# Patient Record
Sex: MALE | Race: BLACK OR AFRICAN AMERICAN | NOT HISPANIC OR LATINO | Employment: FULL TIME | URBAN - METROPOLITAN AREA
[De-identification: names, ages, dates, MRNs, and addresses within clinical notes are randomized per-mention and may not be internally consistent; named-entity substitution may affect disease eponyms.]

---

## 2025-01-30 ENCOUNTER — APPOINTMENT (OUTPATIENT)
Dept: PRIMARY CARE | Facility: CLINIC | Age: 30
End: 2025-01-30
Payer: COMMERCIAL

## 2025-01-30 VITALS — WEIGHT: 244 LBS | SYSTOLIC BLOOD PRESSURE: 138 MMHG | DIASTOLIC BLOOD PRESSURE: 86 MMHG

## 2025-01-30 DIAGNOSIS — M54.50 LOW BACK PAIN, UNSPECIFIED BACK PAIN LATERALITY, UNSPECIFIED CHRONICITY, UNSPECIFIED WHETHER SCIATICA PRESENT: ICD-10-CM

## 2025-01-30 DIAGNOSIS — Z82.49 FAMILY HISTORY OF BRAIN ANEURYSM: ICD-10-CM

## 2025-01-30 DIAGNOSIS — R03.0 ELEVATED BLOOD PRESSURE READING: ICD-10-CM

## 2025-01-30 DIAGNOSIS — Z00.00 HEALTHCARE MAINTENANCE: Primary | ICD-10-CM

## 2025-01-30 DIAGNOSIS — K59.00 CONSTIPATION, UNSPECIFIED CONSTIPATION TYPE: ICD-10-CM

## 2025-01-30 PROCEDURE — 99204 OFFICE O/P NEW MOD 45 MIN: CPT | Performed by: INTERNAL MEDICINE

## 2025-01-30 RX ORDER — PSYLLIUM HUSK 3.4 G/5.8G
1 POWDER ORAL DAILY
Qty: 1040 G | Refills: 2 | Status: SHIPPED | OUTPATIENT
Start: 2025-01-30

## 2025-01-30 RX ORDER — TIZANIDINE 2 MG/1
2 TABLET ORAL DAILY PRN
Qty: 90 TABLET | Refills: 1 | Status: SHIPPED | OUTPATIENT
Start: 2025-01-30 | End: 2025-04-30

## 2025-01-30 NOTE — PROGRESS NOTES
Subjective   Patient ID: Dangelo Snell is a 29 y.o. male who presents for Establish Care and discuss aneursysum.  HPI        Past medical history previously healthy    Patient describes intermittent low backache that radiates across his paralumbar areas for 2 years grade 6 out of 10 seems worse when he bends over he does a lot of lifting at work as well he works as a  better with rest  Denies trauma paralysis paresthesias saddle paresthesias bowel urinary continence focal weakness        Health Maintenance:      Colonoscopy:      Mammogram:      Pelvic/Pap:      Low dose chest CT:      Aorta duplex:      Optho:      Podiatry:        Vaccines:      Refer to Epic Vaccination Log        ROS:      General: denies fever/chills/weight loss      Head: denies HA/trauma/masses/dizziness      Eyes: denies vision change/loss of vision/blurry vision/diplopia/eye pain      Ears: denies hearing loss/tinnitus/otalgia/otorrhea      Nose: denies nasal drainage/anosmia      Throat: denies dysphagia/odynophagia      Lymphatics: denies lymph node swelling      Breast: denies masses/discharge/dimpling/skin changes      Cardiac: denies CP/palpitations/orthopnea/PND      Pulmonary: denies dyspnea/cough/wheezing      GI: Occasional intermittent mild constipation denies abd pain/n/v/diarrhea/melena/hematochezia/hematemesis      : denies dysuria/hematuria/change frequency      Genital: denies genital discharge/lesions      Skin: denies rashes/lesions/masses      MSK: Chronic intermittent low back pain denies weakness/swelling/edema/gait imbalance/pain      Neuro: denies paresthesias/seizures/dysarthria      Psych: denies depression/anxiety/suicidal or homicidal ideations            Objective   /86   Wt 111 kg (244 lb)      Physical Exam:     General: AO3, NAD     Head: atraumatic/NC     Eyes: EOMI/PERRLA. Negative APD     Ears: TM pearly gray, EAC clear. No lesions or erythema     Nose: symmetric nares, no  "discharge     Throat: trachea midline, uvula midline pink mucosa. No thyromegaly     Lymphatics: no cervical/supraclavicular/ant or posterior cervical adenopathy/axillary/inguinal adenopathy     Breast: not examined     Chest: no deformity or tenderness to palpation     Pulm: CTA b/l, no wheeze/rhonchi/rales. nonlabored     Cardiac: RRR +s1s2, no m/r/g.      GI: soft, NT/ND. Normoactive Bsx4. No rebound/guarding.     Rectal: not examined     Genital: not examined     MSK: 5/5 strength UE LE. No edema/clubbing/cyanosis     Skin: no rashes/lesions     Vascular: 2+ palp DP PT radials b/l. Negative carotid bruit     Neuro: CNII-XII intact. No focal deficits. Reflexes 2/4 brachioradialis bicep tricep patellar achilles. Finger to nose intact.     Psych: appropriate mood/affect                    No results found for: \"BMPR1A\", \"CBCDIF\"      Assessment/Plan   Diagnoses and all orders for this visit:  Healthcare maintenance  -     CBC and Auto Differential; Future  -     Comprehensive Metabolic Panel; Future  -     Hemoglobin A1C; Future  -     Lipid Panel; Future  -     Thyroxine, Free; Future  -     Thyroid Stimulating Hormone; Future  -     Referral to Ophthalmology; Future  Low back pain, unspecified back pain laterality, unspecified chronicity, unspecified whether sciatica present  Comments:  Suspect osteoarthritis with superimposed muscle strain spasm  Orders:  -     XR lumbar spine 2-3 views; Future  -     Referral to Physical Therapy; Future  -     tiZANidine (Zanaflex) 2 mg tablet; Take 1 tablet (2 mg) by mouth once daily as needed for muscle spasms.  Constipation, unspecified constipation type  -     psyllium husk, aspartame, (Metamucil Sugar-Free, aspart,) 3.4 gram/5.8 gram powder; Take 1 Scoop by mouth once daily.  Elevated blood pressure reading  -     Blood pressure monitor  Family history of brain aneurysm  -     MR angio head wo IV contrast; Future    Home blood pressure check goal less than 140/90    Work " on ergonomics at work stretching use of the legs    Thank you make an appointment today Olimpia    Please follow-up 3 months    Abhishek Schwarz DO, JOSE CRUZ Schwarz DO

## 2025-02-18 ENCOUNTER — TELEPHONE (OUTPATIENT)
Dept: PRIMARY CARE | Facility: CLINIC | Age: 30
End: 2025-02-18
Payer: COMMERCIAL

## 2025-02-18 NOTE — TELEPHONE ENCOUNTER
Insurance needs you to call to answer info regarding patient specifically family history.  Please call [phone]

## 2025-02-21 ENCOUNTER — DOCUMENTATION (OUTPATIENT)
Dept: PRIMARY CARE | Facility: CLINIC | Age: 30
End: 2025-02-21
Payer: COMMERCIAL

## 2025-02-21 NOTE — PROGRESS NOTES
"Attempted to call insurance back , went through over 6 prompts and kept getting re routed to the website, was on phone over 5 minutes trying to discuss case but no answer by live representative deferred over to \"nneka\" virtual assistance.    Rachel can you please submit appeal letter for patient's MR Stalin head to insurance    View All Conversations on this Encounter  You  Ana Rodríguez2 days ago       Attempted to call patient(number did not connect/did not ring) to gather more info, wrote him epic message instead prior to calling insurance awaiting response from patient thank you     Ana Rodríguez routed conversation to You3 days ago     Ana Rodríguez3 days ago       Insurance needs you to call to answer info regarding patient specifically family history.  Please call [phone]         Note        Dangelo Snell [phone]  Ana Rodríguez     "

## 2025-02-21 NOTE — LETTER
February 21, 2025     Patient: Dangelo Snell   YOB: 1995   Date of Visit:        Insurance:  This is an appeal letter.  Dangelo Snell out of medical necessity requires the MRA head as ordered to rule out brain aneurysm he has a family history of a first-degree relative father age 48 had brain aneurysm, rendering patient with 1-4.7% risk of also having a brain aneurysm.  This test is required to prevent morbidity and mortality and to prevent a delay in diagnosis of a potentially fatal condition namely a brain aneurysm.       Sincerely,         Abhishek Schwarz DO        CC: No Recipients

## 2025-02-28 ENCOUNTER — HOSPITAL ENCOUNTER (OUTPATIENT)
Dept: RADIOLOGY | Facility: CLINIC | Age: 30
End: 2025-02-28
Payer: COMMERCIAL